# Patient Record
Sex: MALE | Race: WHITE | NOT HISPANIC OR LATINO | URBAN - METROPOLITAN AREA
[De-identification: names, ages, dates, MRNs, and addresses within clinical notes are randomized per-mention and may not be internally consistent; named-entity substitution may affect disease eponyms.]

---

## 2017-03-02 ENCOUNTER — AMBULATORY - HEALTHEAST (OUTPATIENT)
Dept: CARDIAC REHAB | Facility: CLINIC | Age: 82
End: 2017-03-02

## 2017-03-02 DIAGNOSIS — Z95.1 S/P CABG X 4: ICD-10-CM

## 2017-03-07 ENCOUNTER — AMBULATORY - HEALTHEAST (OUTPATIENT)
Dept: CARDIAC REHAB | Facility: CLINIC | Age: 82
End: 2017-03-07

## 2017-03-07 DIAGNOSIS — Z95.1 S/P CABG X 4: ICD-10-CM

## 2017-03-09 ENCOUNTER — AMBULATORY - HEALTHEAST (OUTPATIENT)
Dept: CARDIAC REHAB | Facility: CLINIC | Age: 82
End: 2017-03-09

## 2017-03-09 DIAGNOSIS — Z95.1 S/P CABG X 4: ICD-10-CM

## 2017-03-14 ENCOUNTER — AMBULATORY - HEALTHEAST (OUTPATIENT)
Dept: CARDIAC REHAB | Facility: CLINIC | Age: 82
End: 2017-03-14

## 2017-03-14 DIAGNOSIS — Z95.1 S/P CABG X 4: ICD-10-CM

## 2017-03-16 ENCOUNTER — AMBULATORY - HEALTHEAST (OUTPATIENT)
Dept: CARDIAC REHAB | Facility: CLINIC | Age: 82
End: 2017-03-16

## 2017-03-16 DIAGNOSIS — Z95.1 S/P CABG X 4: ICD-10-CM

## 2017-03-21 ENCOUNTER — AMBULATORY - HEALTHEAST (OUTPATIENT)
Dept: CARDIAC REHAB | Facility: CLINIC | Age: 82
End: 2017-03-21

## 2017-03-21 DIAGNOSIS — Z95.1 S/P CABG X 4: ICD-10-CM

## 2017-03-23 ENCOUNTER — AMBULATORY - HEALTHEAST (OUTPATIENT)
Dept: CARDIAC REHAB | Facility: CLINIC | Age: 82
End: 2017-03-23

## 2017-03-23 DIAGNOSIS — Z95.1 S/P CABG X 4: ICD-10-CM

## 2017-03-28 ENCOUNTER — AMBULATORY - HEALTHEAST (OUTPATIENT)
Dept: CARDIAC REHAB | Facility: CLINIC | Age: 82
End: 2017-03-28

## 2017-03-28 DIAGNOSIS — Z95.1 S/P CABG X 4: ICD-10-CM

## 2017-03-30 ENCOUNTER — AMBULATORY - HEALTHEAST (OUTPATIENT)
Dept: CARDIAC REHAB | Facility: CLINIC | Age: 82
End: 2017-03-30

## 2017-03-30 DIAGNOSIS — Z95.1 S/P CABG X 4: ICD-10-CM

## 2017-04-05 ENCOUNTER — AMBULATORY - HEALTHEAST (OUTPATIENT)
Dept: CARDIAC REHAB | Facility: CLINIC | Age: 82
End: 2017-04-05

## 2017-04-14 ENCOUNTER — AMBULATORY - HEALTHEAST (OUTPATIENT)
Dept: CARDIAC REHAB | Facility: CLINIC | Age: 82
End: 2017-04-14

## 2017-04-14 DIAGNOSIS — Z95.1 S/P CABG X 4: ICD-10-CM

## 2017-04-17 ENCOUNTER — AMBULATORY - HEALTHEAST (OUTPATIENT)
Dept: CARDIAC REHAB | Facility: CLINIC | Age: 82
End: 2017-04-17

## 2017-04-17 DIAGNOSIS — Z95.1 S/P CABG X 4: ICD-10-CM

## 2017-04-19 ENCOUNTER — AMBULATORY - HEALTHEAST (OUTPATIENT)
Dept: CARDIAC REHAB | Facility: CLINIC | Age: 82
End: 2017-04-19

## 2017-04-19 DIAGNOSIS — Z95.1 S/P CABG X 4: ICD-10-CM

## 2017-04-21 ENCOUNTER — AMBULATORY - HEALTHEAST (OUTPATIENT)
Dept: CARDIAC REHAB | Facility: CLINIC | Age: 82
End: 2017-04-21

## 2017-04-21 DIAGNOSIS — Z95.1 S/P CABG X 4: ICD-10-CM

## 2017-04-24 ENCOUNTER — AMBULATORY - HEALTHEAST (OUTPATIENT)
Dept: CARDIAC REHAB | Facility: CLINIC | Age: 82
End: 2017-04-24

## 2017-04-24 DIAGNOSIS — Z95.1 S/P CABG X 4: ICD-10-CM

## 2017-04-26 ENCOUNTER — AMBULATORY - HEALTHEAST (OUTPATIENT)
Dept: CARDIAC REHAB | Facility: CLINIC | Age: 82
End: 2017-04-26

## 2017-04-26 DIAGNOSIS — Z95.1 S/P CABG X 4: ICD-10-CM

## 2017-04-28 ENCOUNTER — AMBULATORY - HEALTHEAST (OUTPATIENT)
Dept: CARDIAC REHAB | Facility: CLINIC | Age: 82
End: 2017-04-28

## 2017-04-28 DIAGNOSIS — Z95.1 S/P CABG X 4: ICD-10-CM

## 2017-05-05 ENCOUNTER — AMBULATORY - HEALTHEAST (OUTPATIENT)
Dept: CARDIAC REHAB | Facility: CLINIC | Age: 82
End: 2017-05-05

## 2017-05-05 DIAGNOSIS — I25.10 CAD (CORONARY ARTERY DISEASE): ICD-10-CM

## 2017-05-30 ENCOUNTER — AMBULATORY - HEALTHEAST (OUTPATIENT)
Dept: CARDIAC REHAB | Facility: CLINIC | Age: 82
End: 2017-05-30

## 2017-05-30 DIAGNOSIS — I25.10 CAD (CORONARY ARTERY DISEASE): ICD-10-CM

## 2017-08-22 ENCOUNTER — AMBULATORY - HEALTHEAST (OUTPATIENT)
Dept: CARDIAC REHAB | Facility: CLINIC | Age: 82
End: 2017-08-22

## 2017-08-22 RX ORDER — CARVEDILOL 25 MG/1
25 TABLET ORAL 2 TIMES DAILY
Status: SHIPPED | COMMUNITY
Start: 2017-08-22

## 2017-08-22 RX ORDER — LOSARTAN POTASSIUM 100 MG/1
100 TABLET ORAL DAILY
Status: SHIPPED | COMMUNITY
Start: 2017-08-22

## 2019-11-25 ENCOUNTER — RECORDS - HEALTHEAST (OUTPATIENT)
Dept: RADIOLOGY | Facility: CLINIC | Age: 84
End: 2019-11-25

## 2019-11-25 ENCOUNTER — COMMUNICATION - HEALTHEAST (OUTPATIENT)
Dept: ADMINISTRATIVE | Facility: HOSPITAL | Age: 84
End: 2019-11-25

## 2019-12-10 ENCOUNTER — RECORDS - HEALTHEAST (OUTPATIENT)
Dept: LAB | Facility: CLINIC | Age: 84
End: 2019-12-10

## 2019-12-11 ENCOUNTER — RECORDS - HEALTHEAST (OUTPATIENT)
Dept: LAB | Facility: CLINIC | Age: 84
End: 2019-12-11

## 2019-12-11 LAB
ANION GAP SERPL CALCULATED.3IONS-SCNC: 8 MMOL/L (ref 5–18)
BUN SERPL-MCNC: 22 MG/DL (ref 8–28)
CALCIUM SERPL-MCNC: 9.3 MG/DL (ref 8.5–10.5)
CHLORIDE BLD-SCNC: 102 MMOL/L (ref 98–107)
CO2 SERPL-SCNC: 29 MMOL/L (ref 22–31)
CREAT SERPL-MCNC: 1.01 MG/DL (ref 0.7–1.3)
ERYTHROCYTE [DISTWIDTH] IN BLOOD BY AUTOMATED COUNT: 14.8 % (ref 11–14.5)
GFR SERPL CREATININE-BSD FRML MDRD: >60 ML/MIN/1.73M2
GLUCOSE BLD-MCNC: 124 MG/DL (ref 70–125)
HCT VFR BLD AUTO: 28.7 % (ref 40–54)
HGB BLD-MCNC: 8.7 G/DL (ref 14–18)
MCH RBC QN AUTO: 29.3 PG (ref 27–34)
MCHC RBC AUTO-ENTMCNC: 30.3 G/DL (ref 32–36)
MCV RBC AUTO: 97 FL (ref 80–100)
PLATELET # BLD AUTO: 210 THOU/UL (ref 140–440)
PMV BLD AUTO: 11.5 FL (ref 8.5–12.5)
POTASSIUM BLD-SCNC: 4.6 MMOL/L (ref 3.5–5)
RBC # BLD AUTO: 2.97 MILL/UL (ref 4.4–6.2)
SODIUM SERPL-SCNC: 139 MMOL/L (ref 136–145)
WBC: 8.4 THOU/UL (ref 4–11)

## 2019-12-12 LAB
ALT SERPL W P-5'-P-CCNC: 11 U/L (ref 0–45)
ANION GAP SERPL CALCULATED.3IONS-SCNC: 7 MMOL/L (ref 5–18)
AST SERPL W P-5'-P-CCNC: 12 U/L (ref 0–40)
BUN SERPL-MCNC: 19 MG/DL (ref 8–28)
CALCIUM SERPL-MCNC: 9 MG/DL (ref 8.5–10.5)
CHLORIDE BLD-SCNC: 103 MMOL/L (ref 98–107)
CO2 SERPL-SCNC: 29 MMOL/L (ref 22–31)
CREAT SERPL-MCNC: 0.9 MG/DL (ref 0.7–1.3)
ERYTHROCYTE [DISTWIDTH] IN BLOOD BY AUTOMATED COUNT: 14.7 % (ref 11–14.5)
GFR SERPL CREATININE-BSD FRML MDRD: >60 ML/MIN/1.73M2
GLUCOSE BLD-MCNC: 99 MG/DL (ref 70–125)
HCT VFR BLD AUTO: 25.3 % (ref 40–54)
HGB BLD-MCNC: 7.7 G/DL (ref 14–18)
MCH RBC QN AUTO: 29.5 PG (ref 27–34)
MCHC RBC AUTO-ENTMCNC: 30.4 G/DL (ref 32–36)
MCV RBC AUTO: 97 FL (ref 80–100)
PLATELET # BLD AUTO: 195 THOU/UL (ref 140–440)
PMV BLD AUTO: 11.4 FL (ref 8.5–12.5)
POTASSIUM BLD-SCNC: 4.2 MMOL/L (ref 3.5–5)
RBC # BLD AUTO: 2.61 MILL/UL (ref 4.4–6.2)
SODIUM SERPL-SCNC: 139 MMOL/L (ref 136–145)
TSH SERPL DL<=0.005 MIU/L-ACNC: 1.04 UIU/ML (ref 0.3–5)
WBC: 6.7 THOU/UL (ref 4–11)

## 2019-12-15 ENCOUNTER — RECORDS - HEALTHEAST (OUTPATIENT)
Dept: LAB | Facility: CLINIC | Age: 84
End: 2019-12-15

## 2019-12-16 LAB — HGB BLD-MCNC: 9.6 G/DL (ref 14–18)

## 2019-12-19 ENCOUNTER — RECORDS - HEALTHEAST (OUTPATIENT)
Dept: LAB | Facility: CLINIC | Age: 84
End: 2019-12-19

## 2019-12-19 LAB
ERYTHROCYTE [DISTWIDTH] IN BLOOD BY AUTOMATED COUNT: 14.7 % (ref 11–14.5)
HCT VFR BLD AUTO: 29 % (ref 40–54)
HGB BLD-MCNC: 9.1 G/DL (ref 14–18)
MCH RBC QN AUTO: 30.1 PG (ref 27–34)
MCHC RBC AUTO-ENTMCNC: 31.4 G/DL (ref 32–36)
MCV RBC AUTO: 96 FL (ref 80–100)
PLATELET # BLD AUTO: 154 THOU/UL (ref 140–440)
PMV BLD AUTO: 11.8 FL (ref 8.5–12.5)
RBC # BLD AUTO: 3.02 MILL/UL (ref 4.4–6.2)
WBC: 6.9 THOU/UL (ref 4–11)

## 2019-12-27 ENCOUNTER — RECORDS - HEALTHEAST (OUTPATIENT)
Dept: LAB | Facility: CLINIC | Age: 84
End: 2019-12-27

## 2019-12-27 LAB
ANION GAP SERPL CALCULATED.3IONS-SCNC: 9 MMOL/L (ref 5–18)
BASOPHILS # BLD AUTO: 0 THOU/UL (ref 0–0.2)
BASOPHILS NFR BLD AUTO: 0 % (ref 0–2)
BUN SERPL-MCNC: 17 MG/DL (ref 8–28)
CALCIUM SERPL-MCNC: 10.2 MG/DL (ref 8.5–10.5)
CHLORIDE BLD-SCNC: 100 MMOL/L (ref 98–107)
CO2 SERPL-SCNC: 30 MMOL/L (ref 22–31)
CREAT SERPL-MCNC: 0.89 MG/DL (ref 0.7–1.3)
EOSINOPHIL # BLD AUTO: 0.1 THOU/UL (ref 0–0.4)
EOSINOPHIL NFR BLD AUTO: 1 % (ref 0–6)
ERYTHROCYTE [DISTWIDTH] IN BLOOD BY AUTOMATED COUNT: 14.7 % (ref 11–14.5)
GFR SERPL CREATININE-BSD FRML MDRD: >60 ML/MIN/1.73M2
GLUCOSE BLD-MCNC: 88 MG/DL (ref 70–125)
HCT VFR BLD AUTO: 29.8 % (ref 40–54)
HGB BLD-MCNC: 9.1 G/DL (ref 14–18)
LYMPHOCYTES # BLD AUTO: 3.3 THOU/UL (ref 0.8–4.4)
LYMPHOCYTES NFR BLD AUTO: 53 % (ref 20–40)
MCH RBC QN AUTO: 29.6 PG (ref 27–34)
MCHC RBC AUTO-ENTMCNC: 30.5 G/DL (ref 32–36)
MCV RBC AUTO: 97 FL (ref 80–100)
MONOCYTES # BLD AUTO: 0.2 THOU/UL (ref 0–0.9)
MONOCYTES NFR BLD AUTO: 4 % (ref 2–10)
NEUTROPHILS # BLD AUTO: 2.7 THOU/UL (ref 2–7.7)
NEUTROPHILS NFR BLD AUTO: 42 % (ref 50–70)
PLATELET # BLD AUTO: 131 THOU/UL (ref 140–440)
PMV BLD AUTO: 11.8 FL (ref 8.5–12.5)
POTASSIUM BLD-SCNC: 4.1 MMOL/L (ref 3.5–5)
RBC # BLD AUTO: 3.07 MILL/UL (ref 4.4–6.2)
SODIUM SERPL-SCNC: 139 MMOL/L (ref 136–145)
WBC: 6.3 THOU/UL (ref 4–11)

## 2020-01-01 ENCOUNTER — RECORDS - HEALTHEAST (OUTPATIENT)
Dept: LAB | Facility: CLINIC | Age: 85
End: 2020-01-01

## 2020-01-02 LAB
ALBUMIN SERPL-MCNC: 3 G/DL (ref 3.5–5)
ALP SERPL-CCNC: 109 U/L (ref 45–120)
ALT SERPL W P-5'-P-CCNC: <9 U/L (ref 0–45)
ANION GAP SERPL CALCULATED.3IONS-SCNC: 12 MMOL/L (ref 5–18)
AST SERPL W P-5'-P-CCNC: 14 U/L (ref 0–40)
BASOPHILS # BLD AUTO: 0 THOU/UL (ref 0–0.2)
BASOPHILS NFR BLD AUTO: 0 % (ref 0–2)
BILIRUB DIRECT SERPL-MCNC: 0.1 MG/DL
BILIRUB SERPL-MCNC: 0.3 MG/DL (ref 0–1)
BUN SERPL-MCNC: 24 MG/DL (ref 8–28)
CALCIUM SERPL-MCNC: 11.1 MG/DL (ref 8.5–10.5)
CHLORIDE BLD-SCNC: 101 MMOL/L (ref 98–107)
CO2 SERPL-SCNC: 27 MMOL/L (ref 22–31)
CREAT SERPL-MCNC: 1.09 MG/DL (ref 0.7–1.3)
EOSINOPHIL # BLD AUTO: 0.1 THOU/UL (ref 0–0.4)
EOSINOPHIL NFR BLD AUTO: 1 % (ref 0–6)
ERYTHROCYTE [DISTWIDTH] IN BLOOD BY AUTOMATED COUNT: 14.6 % (ref 11–14.5)
GFR SERPL CREATININE-BSD FRML MDRD: >60 ML/MIN/1.73M2
GLUCOSE BLD-MCNC: 127 MG/DL (ref 70–125)
HCT VFR BLD AUTO: 33.4 % (ref 40–54)
HGB BLD-MCNC: 10.2 G/DL (ref 14–18)
LYMPHOCYTES # BLD AUTO: 4.5 THOU/UL (ref 0.8–4.4)
LYMPHOCYTES NFR BLD AUTO: 53 % (ref 20–40)
MCH RBC QN AUTO: 29.6 PG (ref 27–34)
MCHC RBC AUTO-ENTMCNC: 30.5 G/DL (ref 32–36)
MCV RBC AUTO: 97 FL (ref 80–100)
MONOCYTES # BLD AUTO: 0.3 THOU/UL (ref 0–0.9)
MONOCYTES NFR BLD AUTO: 3 % (ref 2–10)
NEUTROPHILS # BLD AUTO: 3.6 THOU/UL (ref 2–7.7)
NEUTROPHILS NFR BLD AUTO: 42 % (ref 50–70)
PLATELET # BLD AUTO: 180 THOU/UL (ref 140–440)
PMV BLD AUTO: 11.8 FL (ref 8.5–12.5)
POTASSIUM BLD-SCNC: 4.8 MMOL/L (ref 3.5–5)
PROT SERPL-MCNC: 6.1 G/DL (ref 6–8)
RBC # BLD AUTO: 3.45 MILL/UL (ref 4.4–6.2)
SODIUM SERPL-SCNC: 140 MMOL/L (ref 136–145)
TSH SERPL DL<=0.005 MIU/L-ACNC: 0.92 UIU/ML (ref 0.3–5)
WBC: 8.4 THOU/UL (ref 4–11)

## 2020-01-13 ENCOUNTER — RECORDS - HEALTHEAST (OUTPATIENT)
Dept: LAB | Facility: CLINIC | Age: 85
End: 2020-01-13

## 2020-01-15 LAB
ALBUMIN UR-MCNC: ABNORMAL MG/DL
APPEARANCE UR: CLEAR
BACTERIA #/AREA URNS HPF: ABNORMAL HPF
BILIRUB UR QL STRIP: NEGATIVE
COLOR UR AUTO: YELLOW
GLUCOSE UR STRIP-MCNC: NEGATIVE MG/DL
HGB UR QL STRIP: NEGATIVE
HYALINE CASTS #/AREA URNS LPF: ABNORMAL LPF
KETONES UR STRIP-MCNC: NEGATIVE MG/DL
LEUKOCYTE ESTERASE UR QL STRIP: NEGATIVE
MUCOUS THREADS #/AREA URNS LPF: ABNORMAL LPF
NITRATE UR QL: NEGATIVE
PH UR STRIP: 5 [PH] (ref 4.5–8)
RBC #/AREA URNS AUTO: ABNORMAL HPF
SP GR UR STRIP: 1.02 (ref 1–1.03)
SPERM #/AREA URNS HPF: PRESENT /[HPF]
SQUAMOUS #/AREA URNS AUTO: ABNORMAL LPF
UROBILINOGEN UR STRIP-ACNC: ABNORMAL
WBC #/AREA URNS AUTO: ABNORMAL HPF

## 2021-05-30 VITALS — WEIGHT: 170.2 LBS

## 2021-05-30 VITALS — WEIGHT: 169.2 LBS

## 2021-05-30 VITALS — WEIGHT: 176 LBS

## 2021-05-30 VITALS — WEIGHT: 167.8 LBS

## 2021-05-30 VITALS — WEIGHT: 172.6 LBS

## 2021-05-30 VITALS — WEIGHT: 171.2 LBS

## 2021-05-30 VITALS — WEIGHT: 168.8 LBS

## 2021-05-30 VITALS — WEIGHT: 167.4 LBS

## 2021-05-30 VITALS — WEIGHT: 169.4 LBS

## 2021-05-30 VITALS — WEIGHT: 169 LBS

## 2021-05-30 VITALS — WEIGHT: 169.6 LBS

## 2021-05-30 VITALS — WEIGHT: 170.6 LBS

## 2021-05-30 VITALS — WEIGHT: 173.2 LBS

## 2021-05-30 VITALS — WEIGHT: 177 LBS

## 2021-06-03 NOTE — TELEPHONE ENCOUNTER
Called pt to see if he wanted to reschedule the consult that was scheduled for him last Friday,11/22/19 per [facility] for today, 11/25/19 at 1:45 pm at [facility]. [phone] was left for the pt to call back to schedule. I also informed the patient that the consult and planning needed to be done in [place], but the treatments could possibly be done in [place] since he lives there and that is where he wanted to go for the original appointment. The referring MD wanted him seen today, 11/25/19.

## 2021-06-09 NOTE — PROGRESS NOTES
Aleksandar Chen has participated in 11 sessions of Phase II Cardiac Rehab.    Progress Report:   Cardiac Rehab Treatment Progress Report 3/7/2017 3/9/2017   Weight 169 lbs 6 oz 169 lbs 10 oz   Pre Exercise  HR 65 58   Pre Exercise /60 130/70   Pre Blood Sugar (mg/dl) NA NA   Treadmill Peak HR - -   Treadmill Peak Blood Pressure - -   Nustep Peak Heart Rate 74 79-81   Nustep Peak Blood Pressure 124/52 148/60   Heart Rate 57 61   Post Exercise /62 122/70   Post Blood Sugar (mg/dl) - N/A   ECG SR with PACs, PVCs SB/SR w/SA   Total Exercise Minutes 31.5 31         Current Status:  Currently exercising without complaints or symptoms. and Therapists Comments: Pt just returned back on 3/7/2017 after being gone since 11/15/2016. Pt denies cv sx/sx with exercise. Continue to progress as tolerated.     If Physician recommends change in treatment plan, please place orders.        __________________________________________________      _____________  Signature                                                                                                  Date

## 2021-06-09 NOTE — PROGRESS NOTES
"ITP ASSESSMENT   Assessment Day: 60 Day  Session Number: 10  Precautions: Falls risk, surgical, s/p radiation therapy  Diagnosis: CAB  Risk Stratification: Medium  Referring Provider: Cristhian Armendariz MD     EXERCISE  Exercise Assessment: Reassessment                              Exercise Plan  Goals Next 30 days  ADL'S: Pt to resume cooking.  Leisure: Pt to resume grocery shopping.  Work: Retired    Education Goals: All goals in this section met  Education Goals Met: Has system for taking medication.;Patient can state cardiac s/s and appropriate emergency response.;Medication review.                        Goals Met  30 day ADL'S goals met: Pt has not returned to [employer].  30 day Leisure goals met: Pt did not travel to [place] this winter.  30 day Work goals met: Retired  30 Day Progression: Pt had 7 weeks of radiation therapy for CA and therefore has not been active.  Though he is feeling better he does state like he is \"starting over\" physically, since he has been inactive for a while.      Exercise Prescription  Exercise Mode: Treadmill;Bike;Nustep;Arm Erg.  Frequency: 2x/week  Duration: 30-40  Intensity / THR: 20-30 beats above resting heart rate  RPE 11-14  Progression / Met level: 2.6-3.5  Resistive Training?: Yes    Current Exercise (mins/week): 0    Interventions  Home Exercise:  Mode: Walking  Frequency: 2-3x/wk  Duration: 10-15 mins, 1-2x/ day    Education Material : Offer educational classes;Individual education and counseling;Provide written material    Education Completed  Exercise Education Completed: Cardiac Anatomy;Signs and Symptoms;Medication review;RPE;Emergency Plan;Home Exercise;Warm up/cool down;FITT Principles;BP/HR Reponse to exercise;Stretching;Strength training;Benefits of Exercise;End point of exercise            Exercise Follow-up/Discharge  Follow up/Discharge: Pt has not attended rehab in over 4 months because of undergoing radiation therapy.  Will look to progress pt to goal of 3.5-4.5. " "NUTRITION  Nutrition Assessment: Reassessment    Nutrition Risk Factors:  Nutrition Risk Factors: Diabetes;Dyslipidemia;Overweight  Monitors blood sugar at home: Yes  Frequency: 1x/day    Nutrition Plan  Interventions  Nutrition Interventions: Diet consult;Therapist/Patient discussion;Diet class;Provide with written material    Education Completed  Nutrition Education Completed: Low Saturated fat diet;Risk factor overview;Low sodium diet;Weight management    Goals  Nutrition Goals (Next 30 days): Patient can identify their risk factors for CAD;Patient knows appropriate portion size    Goals Met  Nutrition Goals Met: Patient follows a low sodium diet;Reviewed Dietitian schedule;Provided Rate your Plate Survey;Completed Nutritional Risk Screen;Patient states following a low saturated fat diet    Height, Weight, and  BMI  Weight: 169 lb 6.4 oz (76.8 kg)  Height: 5' 10\" (1.778 m)  BMI: 24.31    Nutrition Follow-up  Follow-up/Discharge: Pt has been d/c from [facility] because of low B/S readings.  Pt has been meeting with dietician over at [facility] and states he previously met dietician here, but would not completely rule out meeting with her again.       Other Risk Factors  Other Risk Factor Assessment: Reassessment    HTN Risk Factor: Hypertension    Pre Exercise BP: 144/60  Post Exercise BP: 110/62    Hypertension Plan  Goals  HTN Goals: Exercises regularly    Goals Met  HTN Goals Met: Follow low sodium diet;Take medication as prescribed    HTN Interventions  HTN Interventions: Diet consult;Therapist/patient discussion;Provide written material;Offer educational videos;Offer educational classes    HTN Education Completed  HTN Education Completed: Low sodium diet;Medication review;Risk factor overview    Tobacco Risk Factor: NA        Risk Factor Follow-up   Follow-up/Discharge: Pt has met with several dieticians in past.  Encouraged low salt diet.   PSYCHOSOCIAL  Psychosocial Assessment: Reassessment       Psychosocial Risk " Factor: Stress    Psychosocial Plan  Interventions  Interventions: Provide written material;Offer educational videos and classes;Individual education and counseling    Education Completed  Education Completed: Relaxation/Coping Techniques;S/S of depression;Effects of stress on body    Goals  Goals (Next 30 days): Improvement in [employer] COOP score    Goals Met  Goals Met: Identified Support system;Practicing stress management skills;Identify stressors    Psychosocial Follow-up  Follow-up/Discharge: Pt states he is feeling better emotionally since he has gotten through his radiation therapy.  He states he does not feel a lot of stress and thinks as he becomes more active his stress level will decrease even more.           Patient involved in Goal setting?: Yes    Signature: _____________________________________________________________    Date: __________________    Time: __________________

## 2021-06-10 NOTE — PROGRESS NOTES
"ITP ASSESSMENT   Assessment Day: 90 Day  Session Number: 18  Precautions: Falls risk, surgical, s/p radiation therapy  Diagnosis: CAB  Risk Stratification: Medium  Referring Provider: Cristhian Armendariz MD  EXERCISE  Exercise Assessment: Reassessment                            Exercise Plan  Goals Next 30 days  ADL'S: Pt to resume driving.  Leisure: Pt to resume putting.  Work: Retired    Education Goals: All goals in this section met  Education Goals Met: Has system for taking medication.;Patient can state cardiac s/s and appropriate emergency response.;Medication review.                        Goals Met  60 day ADL'S goals met: Pt has resumed cooking.  60 day Leisure goals met: Pt has resumed grocery shopping.  60 day Work goals met: Retired  No Data Recorded    30 day ADL'S goals met: Pt has not returned to [employer].  30 day Leisure goals met: Pt did not travel to [place] this winter.  30 day Work goals met: Retired  30 Day Progression: Pt had 7 weeks of radiation therapy for CA and therefore has not been active.  Though he is feeling better he does state like he is \"starting over\" physically, since he has been inactive for a while.      Exercise Prescription  Exercise Mode: Treadmill;Bike;Nustep;Arm Erg.  Frequency: 2x/week  Duration: 30-40 mins  Intensity / THR: 20-30 beats above resting heart rate  RPE 11-14  Progression / Met level: 2.6-3.5  Resistive Training?: Yes    Current Exercise (mins/week): 70    Interventions  Home Exercise:  Mode: Walking  Frequency: 2-3x/week  Duration: 5-15 mins    Education Material : Offer educational classes;Individual education and counseling;Provide written material    Education Completed  Exercise Education Completed: Cardiac Anatomy;Signs and Symptoms;Medication review;RPE;Emergency Plan;Home Exercise;Warm up/cool down;FITT Principles;BP/HR Reponse to exercise;Stretching;Strength training;Benefits of Exercise;End point of exercise            Exercise Follow-up/Discharge  Follow " ejvm on auth # informing pt of results and dr directives, informed to call back with any questions or concerns   "up/Discharge: Pt has reached peak MET level of 2.5 on Nustep for 20-25 mins. NUTRITION  Nutrition Assessment: Reassessment    Nutrition Risk Factors:  Nutrition Risk Factors: Diabetes;Dyslipidemia;Overweight  Monitors blood sugar at home: Yes  Frequency: 2-3x/week    Nutrition Plan  Interventions  Nutrition Interventions: Diet consult;Therapist/Patient discussion;Diet class;Provide with written material    Education Completed  Nutrition Education Completed: Low Saturated fat diet;Risk factor overview;Low sodium diet;Weight management    Goals  Nutrition Goals (Next 30 days): Patient can identify their risk factors for CAD;Patient will maintain current weight or gradual weight gain    Goals Met  Nutrition Goals Met: Patient follows a low sodium diet;Reviewed Dietitian schedule;Provided Rate your Plate Survey;Completed Nutritional Risk Screen;Patient states following a low saturated fat diet;Patient knows appropriate portion size    Height, Weight, and  BMI  Weight: 167 lb 12.8 oz (76.1 kg)  Height: 5' 10\" (1.778 m)  BMI: 24.08    Nutrition Follow-up  Follow-up/Discharge: Pt has been d/c from [facility] because of low B/S readings.  Pt has been meeting with dietician over at [facility] and states he previously met dietician here, but would not completely rule out meeting with her again.       Other Risk Factors  Other Risk Factor Assessment: Reassessment    HTN Risk Factor: Hypertension    Pre Exercise BP: 100/60  Post Exercise BP: 122/62    Hypertension Plan  Goals  HTN Goals: Exercises regularly    Goals Met  HTN Goals Met: Follow low sodium diet;Take medication as prescribed    HTN Interventions  HTN Interventions: Diet consult;Therapist/patient discussion;Provide written material;Offer educational videos;Offer educational classes    HTN Education Completed  HTN Education Completed: Low sodium diet;Medication review;Risk factor overview    Tobacco Risk Factor: NA      Risk Factor Follow-up   Follow-up/Discharge: Pt had " met with several dieticians in the past and states he has no further diet questions.  He states he is eating low sodium diet.   PSYCHOSOCIAL  Psychosocial Assessment: Reassessment       Psychosocial Risk Factor: Stress    Psychosocial Plan  Interventions  Interventions: Provide written material;Offer educational videos and classes;Individual education and counseling    Education Completed  Education Completed: Relaxation/Coping Techniques;S/S of depression;Effects of stress on body    Goals  Goals (Next 30 days): Improvement in [employer] COOP score    Goals Met  Goals Met: Identified Support system;Practicing stress management skills;Identify stressors;Oriented to stress management classes    Psychosocial Follow-up  Follow-up/Discharge: Pt states he is feeling good emotionally.  Encouraged him to walk regularly at home to help with physical/emotional well-being.           Patient involved in Goal setting?: Yes    Signature: _____________________________________________________________    Date: __________________    Time: __________________

## 2021-06-10 NOTE — PROGRESS NOTES
Cardiac Rehab  Phase III    SOC Date: 5/30/2017    Diagnosis: CAD  Date of Onset: 8/2016  Procedure: CABG x 4  Date of Onset: 8/31/2016  ICD/Pacemaker: No Parameters: NA  ECG History: SR EF%:55  Past Medical History: HTN, type 2 DM    Precautions/ Physical Limitations: R ft drag, B leg weakness, occ dizziness  Oxygen: No    Social History  Living Accommodations: Home Steps: Yes  Support people at home: wife   Marital Status:   Family is able to assist with cares  Yarsani/Community involvement: none  Recreation/Hobbies: Reading, traveling    Current Activity Level  Mobility status: Pt has not been using walker, and has been walking independently, but still c/o B leg fatigue, occ dizziness  Self Cares/ ADL's: Independent with dressing, bathing  Home management: climbing stairs, grocery shopping, helping with laundry, taking out trash  Driving: No driving currently, but he hopes to return to driving at some point  Sleeping Pattern: good   Appetite: good     Pain  Location: denies  Characteristics:NA  Intensity: (0-10 scale) 0  Current Pain Management: NA  Intervention: NA  Response: NA    Home exercise/Equipment: response    Psychosocial/ Emotional Health  1. In the past 12 months, have you been in a relationship where you have been abused physically, emotionally, sexually or financially? No  notified: NA  2. Who do you turn to for emotional support?: wife  3. Do you have cultural or spiritual needs? No  4. Have there been any major life changes in the past 12 months? Yes- ongoing cancer and associated tx

## 2021-06-10 NOTE — PROGRESS NOTES
Pt has completed 24 sessions of cardiac rehab.  Pt would like to continue rehab, but we are not confident he would continue to make the progression necessary for insurance to continue to cover phase 2 cardiac rehab.  Pt has been asymptomatic with ex and appears stable cardiac-wise.  Discussed self-maintenance program (phase 3 cardiac rehab) with pt, which he seemed interested in.  Will request phase 3 order from his PMD (Dr. Cristhian Armendariz).    Pt also concerned about his balance.  Told him that he would not get the necessary therapy he needs to improve his balance from cardiac rehab.  Will request his PMD eval pt's situation and discuss any potential options available to improve balance with pt.    Walter Grajeda,  Cardiac Rehab Therapist

## 2021-06-10 NOTE — PROGRESS NOTES
"ITP ASSESSMENT   Assessment Day: 120 Day/Discharge  Session Number: 24  Precautions: Falls risk, surgical, radiation therapy  Diagnosis: CAB  Risk Stratification: Medium  Referring Provider: Cristhian Armendariz MD     EXERCISE  Exercise Assessment: Discharge     6 Minute Walk Test   Pre   Pre Exercise HR: 68                  Pre Exercise BP: 132/60    Peak  Peak HR: 77                 Peak BP: 136/50  Peak feet: 725  Peak O2 SAT: 98  Peak RPE: 13  Peak MPH: 1.37    Symptoms:  Peak Symptoms: none    5 mins. Post  5 Min Post HR: 65  5 Min Post BP: 104/58                            Goals Met  90 day ADL'S goals met: Pt has not resumed driving as he doesn't yet feel comfortable.  90 day Leisure goals met: Pt has not resumed putting yet, as he does not feel strong enough.  90 day Work goals met: Retired  No Data Recorded    60 day ADL'S goals met: Pt has resumed cooking.  60 day Leisure goals met: Pt has resumed grocery shopping.  60 day Work goals met: Retired    30 day ADL'S goals met: Pt has not returned to [employer].  30 day Leisure goals met: Pt did not travel to [place] this winter.  30 day Work goals met: Retired  30 Day Progression: Pt had 7 weeks of radiation therapy for CA and therefore has not been active.  Though he is feeling better he does state like he is \"starting over\" physically, since he has been inactive for a while.      Exercise Prescription  Exercise Mode: Treadmill;Bike;Nustep;Arm Erg.  Frequency: 2x/week  Duration: 30-40 mins  Intensity / THR: 20-30 beats above resting heart rate  RPE 11-14  Progression / Met level: 2.6-3.5  Resistive Training?: Yes    Current Exercise (mins/week): 70    Interventions  Home Exercise:  Mode: Walking  Frequency: 2-3x/week  Duration: 10-15    Education Material : Offer educational classes;Individual education and counseling;Provide written material    Education Completed  Exercise Education Completed: Cardiac Anatomy;Signs and Symptoms;Medication review;RPE;Emergency " "Plan;Home Exercise;Warm up/cool down;FITT Principles;BP/HR Reponse to exercise;Stretching;Strength training;Benefits of Exercise;End point of exercise            Exercise Follow-up/Discharge  Follow up/Discharge: Pt has reached peak MET level of 2.7 for 20 mins on Nustep and 2.3 for 10 mins on TM. NUTRITION  Nutrition Assessment: Discharge    Nutrition Risk Factors:  Nutrition Risk Factors: Diabetes;Dyslipidemia;Overweight  Monitors blood sugar at home: Yes  Frequency: 1x/week    Nutrition Plan  Interventions  Nutrition Interventions: Diet consult;Therapist/Patient discussion;Diet class;Provide with written material    Education Completed  Nutrition Education Completed: Low Saturated fat diet;Risk factor overview;Low sodium diet;Weight management    Goals  Nutrition Goals (Next 30 days): Patient can identify their risk factors for CAD;Patient will maintain current weight or gradual weight gain    Goals Met  Nutrition Goals Met: Patient follows a low sodium diet;Reviewed Dietitian schedule;Provided Rate your Plate Survey;Completed Nutritional Risk Screen;Patient states following a low saturated fat diet;Patient knows appropriate portion size    Height, Weight, and  BMI  Weight: 168 lb 12.8 oz (76.6 kg)  Height: 5' 10\" (1.778 m)  BMI: 24.22    Nutrition Follow-up  Follow-up/Discharge: Pt has been d/c from [facility] because of low B/S readings.  Pt has been meeting with dietician over at [facility] and states he previously met dietician here, but would not completely rule out meeting with her again.       Other Risk Factors  Other Risk Factor Assessment: Discharge    HTN Risk Factor: Hypertension    Pre Exercise BP: 132/60  Post Exercise BP: 130/72    Hypertension Plan  Goals  HTN Goals: Exercises regularly    Goals Met  HTN Goals Met: Follow low sodium diet;Take medication as prescribed    HTN Interventions  HTN Interventions: Diet consult;Therapist/patient discussion;Provide written material;Offer educational videos;Offer " educational classes    HTN Education Completed  HTN Education Completed: Low sodium diet;Medication review;Risk factor overview    Tobacco Risk Factor: NA        Risk Factor Follow-up   Follow-up/Discharge: Pt declines need for further diet info at this time.   PSYCHOSOCIAL  Psychosocial Assessment: Discharge     [facility] L [facility] Score: 13 (4/26/17)  Original [facility] Score: 14 (10/7/16)    KARINA-D Score: 0 (4/28/17)  Original KARINA-D score: 0 (10/7/16)    Psychosocial Risk Factor: Stress    Psychosocial Plan  Interventions  Interventions: Provide written material;Offer educational videos and classes;Individual education and counseling    Education Completed  Education Completed: Relaxation/Coping Techniques;S/S of depression;Effects of stress on body    Goals  Goals (Next 30 days): Improvement in [employer] COOP score    Goals Met  Goals Met: Identified Support system;Practicing stress management skills;Identify stressors;Oriented to stress management classes    Psychosocial Follow-up  Follow-up/Discharge: Pt states he is not feeling a lot of stress, but does get frustrated over lack of ability to drive.  He states he has a good support system with his wife, and many friends who he socializes with on a regular bases.           Patient involved in Goal setting?: Yes   Pt may continue exercise with phase 3 rehab.  Pt also concerned with his balance, which he wants to improve.  Will ask his PMD for options.    Signature: _____________________________________________________________    Date: __________________    Time: __________________

## 2021-06-10 NOTE — PROGRESS NOTES
Cardiac Rehab  Phase III Treatment Plan    Risk Factors  High Cholesterol- on meds, labs NA; Hypertension- on meds; Stress- has good support system with wife, family; and Diabetes A1C- 5, checks B/S once/week    Exercise Prescription  THR: 20-30 bpm > RHR  Frequency: 2x/week at rehab; 1-2x/week at home on [employer] bike  Duration: 25-40 mins  RPE: 11-14      Phase 3:  Phase 3 Goal:  Patient will maintain 2-3 met level for 25-40 minutes of continuous/interval exercise. and Phase 3 Plan:  Adjust therapy according to patient tolerance, vital signs and symptoms. Assess functional status and recommend changes as needed.    Primary MD: Dr. Cristhian Armendariz  Phone: [phone]  MD: Dr. Bronwyn Purcell  Phone: [phone]